# Patient Record
Sex: FEMALE | Race: WHITE | URBAN - METROPOLITAN AREA
[De-identification: names, ages, dates, MRNs, and addresses within clinical notes are randomized per-mention and may not be internally consistent; named-entity substitution may affect disease eponyms.]

---

## 2019-01-24 ENCOUNTER — HISTORICAL (OUTPATIENT)
Dept: SURGERY | Facility: HOSPITAL | Age: 44
End: 2019-01-24

## 2022-04-12 ENCOUNTER — HISTORICAL (OUTPATIENT)
Dept: ADMINISTRATIVE | Facility: HOSPITAL | Age: 47
End: 2022-04-12

## 2022-04-30 VITALS
BODY MASS INDEX: 34.16 KG/M2 | HEIGHT: 64 IN | WEIGHT: 200.06 LBS | DIASTOLIC BLOOD PRESSURE: 92 MMHG | SYSTOLIC BLOOD PRESSURE: 144 MMHG

## 2022-04-30 NOTE — OP NOTE
Patient:   Isidro Turk            MRN: [id number]            FIN: [id number]               Age:   43 years     Sex:  Female     :  1975   Associated Diagnoses:   Sacroiliitis   Author:   DEVIN Pathak MD      Operative Note   Operative Information   Date/ Time:  2019 07:03:00.     Preoperative Diagnosis: Sacroiliitis (SOQ39-RU M46.1).     Postoperative Diagnosis: Sacroiliitis (ILE86-CN M46.1).     Surgeon: DEVIN Pathak MD.     Assistant.     Anesthesia: IV Sedation.     Speciman Removed: None.     Description of Procedure/Findings/    Complications: After appropriate operative consents were obtained the patient was taken the operating room and placed on the x-ray table in the prone position.  IV sedation was induced by anesthesia without difficulty.  Under direct image  intensification the left SI joint was localized.  The skin was anesthetized with 3 cc of half percent Marcaine.  A 20-gauge spinal needle was inserted into the left SI joint under direct image intensification.  The placement was verified with half-strength Omnipaque dye.  1 cc of Kenalog 40 with 2 cc of Marcaine were instilled into the left SI joint.    The patient tolerated the injection without difficulty, was awakened from anesthesia and transferred to 1 day stay in stable condition..

## 2022-05-05 NOTE — HISTORICAL [facility] CERNER
This is a historical note converted from [facility]. Formatting and pictures may have been removed.  Please reference [facility] for original formatting and attached multimedia. Chief Complaint  Left SI joint  History of Present Illness  This 43-year-old presents today for preoperative evaluation for left sacroiliac joint arthrogram and injection. I reviewed the indications for surgery. The risks and benefits of the proposed and alternative treatments were discussed with the patient. Questions pertinent to the procedure were solicited and answered. No assurances were given. Informed consent was obtained. The patient expressed good understanding and wished to proceed with scheduling the procedure.  Review of Systems  Constitutional: No fever, weakness, or fatigue.  Ear/Nose/Mouth/Throat: No nasal congestion or sore throat.  Respiratory: No shortness of breath or cough.  Cardiovascular: No chest pain, palpitations, or peripheral edema.  Gastrointestinal: No nausea, vomiting, or abdominal pain.  Genitourinary: No dysuria.  Musculoskeletal: See current complaints  Integumentary: Negative.  Physical Exam  HR:?87(Peripheral)? BP:?155/104?  HT:?162?cm? HT:?162?cm? WT:?90.71?kg? WT:?90.71?kg? BMI:?34.56?  Appearance: No distress good color on room air. Alert and cooperative.  HEENT: Normocephalic. PERRLA EOM intact. Nose & throat clear.  Lungs: Clear to auscultation and percussion.  Heart: Regular rate and rhythm without murmurs or gallops.  Extremities:Physical examination shows that the patient walks with a mildly antalgic gait. ?She is exquisitely tender on her left SI joint and mildly tender at her left trochanteric bursa.? She has no tenderness at her?iliotibial band. ?She has no groin tenderness.? Examination shows that hip flexion is?95 degrees, extension is 0 degrees,?internal rotation is 15 degrees and external rotation is 30 degrees.? Abduction is 40 degrees. ?She has a strongly positive Fabers test on the left with  tightness in her piriformis but?also her iliopsoas to a less degree.? She appears to be motor and sensory intact and has no muscle deficiency.  ?   Review of x-rays AP pelvis with lateral of the left hip shows a small calcific deposit at her piriformis insertion.? Otherwise her x-rays appear to be negative.  Skin: No rashes or open wounds.  Neuro: No focal weakness, loss of sensation or incoordination. Gait and station are normal.  Other: DTRs equal.  Assessment/Plan  1.?Sacroiliitis?M46.1  ? Plan for left SI joint injection at [facility]. The patient has been given preoperative instructions. Post operative appointment is scheduled for?2 weeks after surgery.   Problem List/Past Medical History  Ongoing  Obesity  Pacemaker catheter, device  Sacroiliitis  Historical  Hypertension  Procedure/Surgical History  Knee  Pacemaker care  VSD - Closure of ventricular septal defect   Medications  Inpatient  No active inpatient medications  Home  Depo-Provera  Allergies  No Known Medication Allergies  Social History  Alcohol  Current, 1-2 times per month, 01/16/2019  Substance Abuse  Never, 01/16/2019  Tobacco  Never (less than 100 in lifetime), N/A, 01/20/2019  Never (less than 100 in lifetime), N/A, 01/16/2019  Family History  Diabetes mellitus type 1.: Mother.